# Patient Record
Sex: FEMALE | Race: WHITE | NOT HISPANIC OR LATINO
[De-identification: names, ages, dates, MRNs, and addresses within clinical notes are randomized per-mention and may not be internally consistent; named-entity substitution may affect disease eponyms.]

---

## 2017-02-06 ENCOUNTER — MEDICATION RENEWAL (OUTPATIENT)
Age: 47
End: 2017-02-06

## 2017-08-04 ENCOUNTER — APPOINTMENT (OUTPATIENT)
Dept: ORTHOPEDIC SURGERY | Facility: CLINIC | Age: 47
End: 2017-08-04

## 2017-08-10 ENCOUNTER — APPOINTMENT (OUTPATIENT)
Dept: ORTHOPEDIC SURGERY | Facility: CLINIC | Age: 47
End: 2017-08-10
Payer: MEDICARE

## 2017-08-10 VITALS
WEIGHT: 130 LBS | HEART RATE: 67 BPM | SYSTOLIC BLOOD PRESSURE: 91 MMHG | DIASTOLIC BLOOD PRESSURE: 55 MMHG | HEIGHT: 65 IN | BODY MASS INDEX: 21.66 KG/M2

## 2017-08-10 DIAGNOSIS — F41.1 GENERALIZED ANXIETY DISORDER: ICD-10-CM

## 2017-08-10 DIAGNOSIS — Z87.19 PERSONAL HISTORY OF OTHER DISEASES OF THE DIGESTIVE SYSTEM: ICD-10-CM

## 2017-08-10 PROCEDURE — 99204 OFFICE O/P NEW MOD 45 MIN: CPT

## 2017-08-10 PROCEDURE — 72040 X-RAY EXAM NECK SPINE 2-3 VW: CPT

## 2017-09-07 ENCOUNTER — APPOINTMENT (OUTPATIENT)
Dept: ORTHOPEDIC SURGERY | Facility: CLINIC | Age: 47
End: 2017-09-07

## 2017-10-09 ENCOUNTER — RX RENEWAL (OUTPATIENT)
Age: 47
End: 2017-10-09

## 2017-10-31 ENCOUNTER — MEDICATION RENEWAL (OUTPATIENT)
Age: 47
End: 2017-10-31

## 2017-11-01 ENCOUNTER — RX RENEWAL (OUTPATIENT)
Age: 47
End: 2017-11-01

## 2018-01-08 ENCOUNTER — RX RENEWAL (OUTPATIENT)
Age: 48
End: 2018-01-08

## 2018-01-09 ENCOUNTER — MEDICATION RENEWAL (OUTPATIENT)
Age: 48
End: 2018-01-09

## 2018-01-09 ENCOUNTER — APPOINTMENT (OUTPATIENT)
Dept: ENDOCRINOLOGY | Facility: CLINIC | Age: 48
End: 2018-01-09

## 2018-02-02 ENCOUNTER — APPOINTMENT (OUTPATIENT)
Dept: ENDOCRINOLOGY | Facility: CLINIC | Age: 48
End: 2018-02-02

## 2018-04-25 ENCOUNTER — RX RENEWAL (OUTPATIENT)
Age: 48
End: 2018-04-25

## 2018-08-05 ENCOUNTER — RX RENEWAL (OUTPATIENT)
Age: 48
End: 2018-08-05

## 2018-11-06 ENCOUNTER — APPOINTMENT (OUTPATIENT)
Dept: ENDOCRINOLOGY | Facility: CLINIC | Age: 48
End: 2018-11-06

## 2018-12-05 ENCOUNTER — APPOINTMENT (OUTPATIENT)
Dept: ENDOCRINOLOGY | Facility: CLINIC | Age: 48
End: 2018-12-05
Payer: MEDICARE

## 2018-12-05 VITALS
HEART RATE: 59 BPM | OXYGEN SATURATION: 98 % | HEIGHT: 65 IN | DIASTOLIC BLOOD PRESSURE: 62 MMHG | WEIGHT: 124.8 LBS | SYSTOLIC BLOOD PRESSURE: 100 MMHG | BODY MASS INDEX: 20.79 KG/M2

## 2018-12-05 PROCEDURE — 99214 OFFICE O/P EST MOD 30 MIN: CPT | Mod: GC

## 2018-12-18 ENCOUNTER — APPOINTMENT (OUTPATIENT)
Dept: OBGYN | Facility: CLINIC | Age: 48
End: 2018-12-18

## 2019-01-09 ENCOUNTER — APPOINTMENT (OUTPATIENT)
Dept: ORTHOPEDIC SURGERY | Facility: CLINIC | Age: 49
End: 2019-01-09

## 2019-03-22 ENCOUNTER — APPOINTMENT (OUTPATIENT)
Dept: PAIN MANAGEMENT | Facility: CLINIC | Age: 49
End: 2019-03-22

## 2019-03-29 ENCOUNTER — APPOINTMENT (OUTPATIENT)
Dept: OBGYN | Facility: CLINIC | Age: 49
End: 2019-03-29
Payer: MEDICARE

## 2019-03-29 VITALS
BODY MASS INDEX: 21.16 KG/M2 | WEIGHT: 127 LBS | HEART RATE: 66 BPM | HEIGHT: 65 IN | SYSTOLIC BLOOD PRESSURE: 113 MMHG | DIASTOLIC BLOOD PRESSURE: 64 MMHG

## 2019-03-29 DIAGNOSIS — Z86.59 PERSONAL HISTORY OF OTHER MENTAL AND BEHAVIORAL DISORDERS: ICD-10-CM

## 2019-03-29 DIAGNOSIS — Z86.39 PERSONAL HISTORY OF OTHER ENDOCRINE, NUTRITIONAL AND METABOLIC DISEASE: ICD-10-CM

## 2019-03-29 DIAGNOSIS — M50.30 OTHER CERVICAL DISC DEGENERATION, UNSPECIFIED CERVICAL REGION: ICD-10-CM

## 2019-03-29 DIAGNOSIS — M47.22 OTHER SPONDYLOSIS WITH RADICULOPATHY, CERVICAL REGION: ICD-10-CM

## 2019-03-29 DIAGNOSIS — Z12.31 ENCOUNTER FOR SCREENING MAMMOGRAM FOR MALIGNANT NEOPLASM OF BREAST: ICD-10-CM

## 2019-03-29 DIAGNOSIS — D25.0 SUBMUCOUS LEIOMYOMA OF UTERUS: ICD-10-CM

## 2019-03-29 DIAGNOSIS — Z87.39 PERSONAL HISTORY OF OTHER DISEASES OF THE MUSCULOSKELETAL SYSTEM AND CONNECTIVE TISSUE: ICD-10-CM

## 2019-03-29 PROCEDURE — 99203 OFFICE O/P NEW LOW 30 MIN: CPT

## 2019-03-31 PROBLEM — Z87.39 HISTORY OF NECK PAIN: Status: RESOLVED | Noted: 2017-08-10 | Resolved: 2019-03-31

## 2019-03-31 PROBLEM — M47.22 OSTEOARTHRITIS OF SPINE WITH RADICULOPATHY, CERVICAL REGION: Status: RESOLVED | Noted: 2017-08-10 | Resolved: 2019-03-31

## 2019-03-31 PROBLEM — M50.30 DEGENERATION, INTERVERTEBRAL DISC, CERVICAL: Status: RESOLVED | Noted: 2017-08-10 | Resolved: 2019-03-31

## 2019-03-31 NOTE — PHYSICAL EXAM
[Awake] : awake [Alert] : alert [Acute Distress] : no acute distress [Soft] : soft [Tender] : non tender [Distended] : not distended [Oriented x3] : oriented to person, place, and time [Depressed Mood] : not depressed

## 2019-03-31 NOTE — HISTORY OF PRESENT ILLNESS
[1 Year Ago] : 1 year ago [Good] : being in good health [Sexually Active] : is sexually active [Monogamous] : is monogamous [Male ___] : [unfilled] male [NA] : N/A

## 2019-05-06 ENCOUNTER — OUTPATIENT (OUTPATIENT)
Dept: OUTPATIENT SERVICES | Facility: HOSPITAL | Age: 49
LOS: 1 days | End: 2019-05-06
Payer: MEDICARE

## 2019-05-06 VITALS
HEART RATE: 67 BPM | TEMPERATURE: 98 F | DIASTOLIC BLOOD PRESSURE: 59 MMHG | RESPIRATION RATE: 14 BRPM | OXYGEN SATURATION: 96 % | HEIGHT: 65 IN | WEIGHT: 126.99 LBS | SYSTOLIC BLOOD PRESSURE: 100 MMHG

## 2019-05-06 DIAGNOSIS — D25.0 SUBMUCOUS LEIOMYOMA OF UTERUS: ICD-10-CM

## 2019-05-06 DIAGNOSIS — Z98.890 OTHER SPECIFIED POSTPROCEDURAL STATES: Chronic | ICD-10-CM

## 2019-05-06 DIAGNOSIS — Z98.41 CATARACT EXTRACTION STATUS, RIGHT EYE: Chronic | ICD-10-CM

## 2019-05-06 DIAGNOSIS — Q18.1 PREAURICULAR SINUS AND CYST: Chronic | ICD-10-CM

## 2019-05-06 DIAGNOSIS — Z01.818 ENCOUNTER FOR OTHER PREPROCEDURAL EXAMINATION: ICD-10-CM

## 2019-05-06 LAB
HCT VFR BLD CALC: 40.5 % — SIGNIFICANT CHANGE UP (ref 34.5–45)
HGB BLD-MCNC: 13.1 G/DL — SIGNIFICANT CHANGE UP (ref 11.5–15.5)
MCHC RBC-ENTMCNC: 29.8 PG — SIGNIFICANT CHANGE UP (ref 27–34)
MCHC RBC-ENTMCNC: 32.3 GM/DL — SIGNIFICANT CHANGE UP (ref 32–36)
MCV RBC AUTO: 92 FL — SIGNIFICANT CHANGE UP (ref 80–100)
PLATELET # BLD AUTO: 254 K/UL — SIGNIFICANT CHANGE UP (ref 150–400)
RBC # BLD: 4.4 M/UL — SIGNIFICANT CHANGE UP (ref 3.8–5.2)
RBC # FLD: 12.5 % — SIGNIFICANT CHANGE UP (ref 10.3–14.5)
WBC # BLD: 5.2 K/UL — SIGNIFICANT CHANGE UP (ref 3.8–10.5)
WBC # FLD AUTO: 5.2 K/UL — SIGNIFICANT CHANGE UP (ref 3.8–10.5)

## 2019-05-06 PROCEDURE — 85027 COMPLETE CBC AUTOMATED: CPT

## 2019-05-06 PROCEDURE — G0463: CPT

## 2019-05-06 RX ORDER — LANOLIN ALCOHOL/MO/W.PET/CERES
2 CREAM (GRAM) TOPICAL
Qty: 0 | Refills: 0 | COMMUNITY

## 2019-05-06 RX ORDER — ASCORBIC ACID 60 MG
1 TABLET,CHEWABLE ORAL
Qty: 0 | Refills: 0 | COMMUNITY

## 2019-05-06 RX ORDER — ALPRAZOLAM 0.25 MG
1 TABLET ORAL
Qty: 0 | Refills: 0 | COMMUNITY

## 2019-05-06 RX ORDER — UBIDECARENONE 100 MG
200 CAPSULE ORAL
Qty: 0 | Refills: 0 | COMMUNITY

## 2019-05-06 RX ORDER — CHOLECALCIFEROL (VITAMIN D3) 125 MCG
1 CAPSULE ORAL
Qty: 0 | Refills: 0 | COMMUNITY

## 2019-05-06 RX ORDER — LORATADINE 10 MG/1
1 TABLET ORAL
Qty: 0 | Refills: 0 | COMMUNITY

## 2019-05-06 RX ORDER — FLUOXETINE HCL 10 MG
1 CAPSULE ORAL
Qty: 0 | Refills: 0 | COMMUNITY

## 2019-05-06 NOTE — H&P PST ADULT - HISTORY OF PRESENT ILLNESS
49 yo presents for initial evaluation with outside medical records. With heavy bleeding and presence of two endometrial masses on sonohysterogram. I sat down with the patient to discuss the imaging findings & her symptoms which warrant intervention. I explained that the thickened endometrium can be caused by polyps or myoma. Discussion about management options for thickened endometrium and masses including continued observation and surgery. Hysteroscopy with removal of endometrial mass.       47 y.o. female with h/o hyperprolactinemia and pituitary adenoma here for follow up visit. Diagnosed with hyperprolactinemia during fertility work up in 2011. Reports has been having increased menstrual cycles, seeing ob/gyn for fibroids. No galactorrhea. Had visual field testing which was normal in 2015. No headaches. Does get anxiety and on Xanax. Taking Bromocriptine 2.5 mg daily. Reports ran out of medication 1 week ago, otherwise taking well. Last prolactin in Oct 2016 was 11.9. Last pituitary MRI from May 2016 shows 3.9 mm pituitary lesion. No optic chiasm or nerve compression. Patient without any plans for pregnancy. 47 yo female. c/o menorrhagia and imaging study revealed two endometrial masses on sonohysterogram. presents to PST scheduled for operative hysteroscopy with resectoscope and PAP smear under anesthesia.   PMH: h/o pituitary adenoma, last visit with endocrinologist, dr. Castorena 12/2018- note stated pt to repeat MRI in 3-5 years.      47 y.o. female with h/o hyperprolactinemia and pituitary adenoma here for follow up visit. Diagnosed with hyperprolactinemia during fertility work up in 2011. Reports has been having increased menstrual cycles, seeing ob/gyn for fibroids. No galactorrhea. Had visual field testing which was normal in 2015. No headaches. Does get anxiety and on Xanax. Taking Bromocriptine 2.5 mg daily. Reports ran out of medication 1 week ago, otherwise taking well. Last prolactin in Oct 2016 was 11.9. Last pituitary MRI from May 2016 shows 3.9 mm pituitary lesion. No optic chiasm or nerve compression. Patient without any plans for pregnancy. 49 yo female. c/o menorrhagia and imaging study revealed two endometrial masses on sonohysterogram. presents to PST scheduled for operative hysteroscopy with resectoscope and PAP smear under anesthesia.   PMH: h/o pituitary adenoma, last visit with endocrinologist, dr. Castorena 12/2018- note stated pt to repeat MRI in 3-5 years.    **at PST, pt c/o recurring dizziness, pt is not sure if it's related to anxiety, hypoglycemia, anemia or other etiology. denies LOC.  Last dizziness over 1 week ago.  Pt has not seen a PCP for over 1 year.  pt was instructed to obtain preop evaluation prior to planned procedure. Pt currently does not have a PCP, but stated she will find one and call [facility] to notify PCP's name and phone number.  Left phone message with Yuli,  at Dr. Yarbrough' office of above.***    47 y.o. female with h/o hyperprolactinemia and pituitary adenoma here for follow up visit. Diagnosed with hyperprolactinemia during fertility work up in 2011. Reports has been having increased menstrual cycles, seeing ob/gyn for fibroids. No galactorrhea. Had visual field testing which was normal in 2015. No headaches. Does get anxiety and on Xanax. Taking Bromocriptine 2.5 mg daily. Reports ran out of medication 1 week ago, otherwise taking well. Last prolactin in Oct 2016 was 11.9. Last pituitary MRI from May 2016 shows 3.9 mm pituitary lesion. No optic chiasm or nerve compression. Patient without any plans for pregnancy. 47 yo female. c/o menorrhagia and imaging study revealed two endometrial masses on sonohysterogram. presents to PST scheduled for operative hysteroscopy with resectoscope and PAP smear under anesthesia.   PMH: h/o pituitary adenoma,   **at PST, pt c/o recurring dizziness, pt is not sure if it's related to anxiety, hypoglycemia, anemia or other etiology. denies LOC.  Last dizziness over 1 week ago.  Pt has not seen a PCP for over 1 year.  pt was instructed to obtain preop evaluation prior to planned procedure. Pt currently does not have a PCP, but stated she will find one and call [facility] to notify PCP's name and phone number.  Left phone message with Yuli,  at Dr. Yarbrough' office of above.***    47 y.o. female with h/o hyperprolactinemia and pituitary adenoma here for follow up visit. Diagnosed with hyperprolactinemia during fertility work up in 2011. Reports has been having increased menstrual cycles, seeing ob/gyn for fibroids. No galactorrhea. Had visual field testing which was normal in 2015. No headaches. Does get anxiety and on Xanax. Taking Bromocriptine 2.5 mg daily. Reports ran out of medication 1 week ago, otherwise taking well. Last prolactin in Oct 2016 was 11.9. Last pituitary MRI from May 2016 shows 3.9 mm pituitary lesion. No optic chiasm or nerve compression. Patient without any plans for pregnancy. 47 yo female. PMH pituitary adenoma (last endo visit 12/2018- note on chart), MVP.  c/o menorrhagia, sonohysterogram revealed two endometrial masses. presents to PST scheduled for operative hysteroscopy with resectoscope and PAP smear under anesthesia.   **at PST, pt c/o recurring dizziness, pt is not sure if it's related to anxiety, hypoglycemia, anemia or other etiology. denies LOC.  Last dizziness over 1 week ago.  Pt has not seen a PCP for over 1 year.  pt was instructed to obtain preop evaluation prior to planned procedure. Pt currently does not have a PCP, but stated she will find one and call PST to notify PCP's name and phone number.  Left phone message with Yuli,  at Dr. Yarbrough' office of above.***

## 2019-05-06 NOTE — H&P PST ADULT - NSICDXPASTMEDICALHX_GEN_ALL_CORE_FT
PAST MEDICAL HISTORY:  Anxiety     IBS (irritable bowel syndrome)     Insomnia     MVP (mitral valve prolapse)     Pituitary adenoma

## 2019-05-06 NOTE — H&P PST ADULT - NSICDXPASTSURGICALHX_GEN_ALL_CORE_FT
PAST SURGICAL HISTORY:  Cyst on ear s/p excision left    H/O cataract extraction, right     H/O rhinoplasty

## 2019-05-06 NOTE — H&P PST ADULT - NSICDXPROBLEM_GEN_ALL_CORE_FT
PROBLEM DIAGNOSES  Problem: Submucous leiomyoma of uterus  Assessment and Plan: D&C  operative hysteroscopy w/ resectoscope  Pap Smear under anesthesia

## 2019-05-07 PROBLEM — D35.2 BENIGN NEOPLASM OF PITUITARY GLAND: Chronic | Status: ACTIVE | Noted: 2019-05-06

## 2019-05-09 ENCOUNTER — APPOINTMENT (OUTPATIENT)
Dept: OBGYN | Facility: HOSPITAL | Age: 49
End: 2019-05-09

## 2019-05-09 PROBLEM — I34.1 NONRHEUMATIC MITRAL (VALVE) PROLAPSE: Chronic | Status: ACTIVE | Noted: 2019-05-06

## 2019-05-09 PROBLEM — F41.9 ANXIETY DISORDER, UNSPECIFIED: Chronic | Status: ACTIVE | Noted: 2019-05-06

## 2019-05-09 PROBLEM — K58.9 IRRITABLE BOWEL SYNDROME WITHOUT DIARRHEA: Chronic | Status: ACTIVE | Noted: 2019-05-06

## 2019-05-09 PROBLEM — G47.00 INSOMNIA, UNSPECIFIED: Chronic | Status: ACTIVE | Noted: 2019-05-06

## 2019-05-13 LAB
ACTH SER-ACNC: 9.2 PG/ML
CORTIS SERPL-MCNC: 7.3 UG/DL
ESTRADIOL SERPL-MCNC: 85 PG/ML
FSH SERPL-MCNC: 3.7 IU/L
IGF BP1 SERPL-MCNC: 127 NG/ML
LH SERPL-ACNC: 2.9 IU/L
PROLACTIN SERPL-MCNC: 53.4 NG/ML
T4 FREE SERPL-MCNC: 1 NG/DL
TSH SERPL-ACNC: 1.1 UIU/ML

## 2019-05-21 ENCOUNTER — APPOINTMENT (OUTPATIENT)
Dept: OBGYN | Facility: HOSPITAL | Age: 49
End: 2019-05-21

## 2019-05-28 ENCOUNTER — APPOINTMENT (OUTPATIENT)
Dept: OBGYN | Facility: CLINIC | Age: 49
End: 2019-05-28

## 2019-06-04 ENCOUNTER — APPOINTMENT (OUTPATIENT)
Dept: OBGYN | Facility: CLINIC | Age: 49
End: 2019-06-04

## 2019-06-05 ENCOUNTER — APPOINTMENT (OUTPATIENT)
Dept: OBGYN | Facility: CLINIC | Age: 49
End: 2019-06-05

## 2019-10-09 ENCOUNTER — APPOINTMENT (OUTPATIENT)
Dept: ENDOCRINOLOGY | Facility: CLINIC | Age: 49
End: 2019-10-09

## 2019-10-30 ENCOUNTER — APPOINTMENT (OUTPATIENT)
Dept: ENDOCRINOLOGY | Facility: CLINIC | Age: 49
End: 2019-10-30

## 2019-11-07 ENCOUNTER — APPOINTMENT (OUTPATIENT)
Dept: OBGYN | Facility: CLINIC | Age: 49
End: 2019-11-07

## 2019-12-06 ENCOUNTER — APPOINTMENT (OUTPATIENT)
Dept: OBGYN | Facility: CLINIC | Age: 49
End: 2019-12-06

## 2020-01-13 ENCOUNTER — APPOINTMENT (OUTPATIENT)
Dept: ENDOCRINOLOGY | Facility: CLINIC | Age: 50
End: 2020-01-13

## 2020-02-24 ENCOUNTER — APPOINTMENT (OUTPATIENT)
Dept: ENDOCRINOLOGY | Facility: CLINIC | Age: 50
End: 2020-02-24

## 2020-03-27 ENCOUNTER — APPOINTMENT (OUTPATIENT)
Dept: OBGYN | Facility: CLINIC | Age: 50
End: 2020-03-27

## 2020-04-21 ENCOUNTER — APPOINTMENT (OUTPATIENT)
Dept: RHEUMATOLOGY | Facility: CLINIC | Age: 50
End: 2020-04-21

## 2020-05-05 ENCOUNTER — APPOINTMENT (OUTPATIENT)
Dept: OBGYN | Facility: CLINIC | Age: 50
End: 2020-05-05

## 2020-05-18 ENCOUNTER — APPOINTMENT (OUTPATIENT)
Dept: OBGYN | Facility: CLINIC | Age: 50
End: 2020-05-18
Payer: MEDICARE

## 2020-05-18 ENCOUNTER — ASOB RESULT (OUTPATIENT)
Age: 50
End: 2020-05-18

## 2020-05-18 VITALS
WEIGHT: 130 LBS | TEMPERATURE: 97.9 F | HEIGHT: 65 IN | SYSTOLIC BLOOD PRESSURE: 104 MMHG | HEART RATE: 55 BPM | DIASTOLIC BLOOD PRESSURE: 66 MMHG | BODY MASS INDEX: 21.66 KG/M2

## 2020-05-18 DIAGNOSIS — Z01.411 ENCOUNTER FOR GYNECOLOGICAL EXAMINATION (GENERAL) (ROUTINE) WITH ABNORMAL FINDINGS: ICD-10-CM

## 2020-05-18 PROCEDURE — 76831 ECHO EXAM UTERUS: CPT

## 2020-05-18 PROCEDURE — 99396 PREV VISIT EST AGE 40-64: CPT

## 2020-05-19 LAB
CANDIDA VAG CYTO: NOT DETECTED
G VAGINALIS+PREV SP MTYP VAG QL MICRO: NOT DETECTED
HBV SURFACE AG SER QL: NONREACTIVE
HCV AB SER QL: NONREACTIVE
HCV S/CO RATIO: 0.15 S/CO
HIV1+2 AB SPEC QL IA.RAPID: NONREACTIVE
HPV HIGH+LOW RISK DNA PNL CVX: NOT DETECTED
T PALLIDUM AB SER QL IA: NEGATIVE
T VAGINALIS VAG QL WET PREP: NOT DETECTED

## 2020-05-19 NOTE — HISTORY OF PRESENT ILLNESS
[1 Year Ago] : 1 year ago [Fair] : being in fair health [Regular Exercise] : regular exercise [Weight Concerns] : no concerns with her weight [Unsure Pap Smear] : uncertain timing of her last Papanicolaou cytology [Reproductive Age] : is of reproductive age [Menstrual Problems] : reports abnormal menses [Definite ___ (Date)] : the last menstrual period was [unfilled] [Irregular Cycle Intervals] : are  irregular [Frequency: Q ___ days] : occur approximately every [unfilled] days [Interval Has Decreased] : have been more frequent [Contraception] : does not use contraception [Sexually Active] : is sexually active [Monogamous] : is monogamous

## 2020-05-19 NOTE — REVIEW OF SYSTEMS
[Fever] : no fever [Abdominal Pain] : no abdominal pain [Dysuria] : no dysuria [Breast Pain] : no breast pain [Abn Vag Bleeding] : abnormal vaginal bleeding [Pelvic Pain] : no pelvic pain [Nl] : Musculoskeletal

## 2020-05-19 NOTE — PHYSICAL EXAM
[Awake] : awake [Alert] : alert [Acute Distress] : no acute distress [Mass] : no breast mass [Nipple Discharge] : no nipple discharge [Axillary LAD] : no axillary lymphadenopathy [Soft] : soft [Tender] : non tender [Oriented x3] : oriented to person, place, and time [No Bleeding] : there was no active vaginal bleeding [Normal] : uterus [Pap Obtained] : a Pap smear was performed [Uterine Adnexae] : were not tender and not enlarged

## 2020-05-20 LAB
C TRACH RRNA SPEC QL NAA+PROBE: NOT DETECTED
CYTOLOGY CVX/VAG DOC THIN PREP: NORMAL
N GONORRHOEA RRNA SPEC QL NAA+PROBE: NOT DETECTED
SOURCE AMPLIFICATION: NORMAL

## 2020-08-04 ENCOUNTER — APPOINTMENT (OUTPATIENT)
Dept: RHEUMATOLOGY | Facility: CLINIC | Age: 50
End: 2020-08-04
Payer: MEDICARE

## 2020-08-04 VITALS
DIASTOLIC BLOOD PRESSURE: 60 MMHG | WEIGHT: 130 LBS | TEMPERATURE: 97.3 F | SYSTOLIC BLOOD PRESSURE: 95 MMHG | OXYGEN SATURATION: 98 % | HEART RATE: 70 BPM | BODY MASS INDEX: 21.66 KG/M2 | HEIGHT: 65 IN

## 2020-08-04 DIAGNOSIS — Z87.891 PERSONAL HISTORY OF NICOTINE DEPENDENCE: ICD-10-CM

## 2020-08-04 DIAGNOSIS — Z82.61 FAMILY HISTORY OF ARTHRITIS: ICD-10-CM

## 2020-08-04 PROCEDURE — 99203 OFFICE O/P NEW LOW 30 MIN: CPT

## 2020-08-04 NOTE — REVIEW OF SYSTEMS
[Chills] : no chills [Fever] : no fever [As Noted in HPI] : as noted in HPI [Negative] : Gastrointestinal

## 2020-08-04 NOTE — ASSESSMENT
[FreeTextEntry1] : =Chronic generalized arthralgias for many years, appear non inflammatory in pattern\par No evidence of inflammatory arthritis on exam today \par Several tender points, reports associated low energy, fatigue and poor sleep\par Longstanding history of anxiety on SSRI\par Suspect FM rather than inflammatory arthritis\par recommend to obtain serologies today for completion\par Xray of the hands\par follow up in 2 weeks to discuss results\par \par \par \par Patient aware of my assessment and plan, all questions answered.\par

## 2020-08-04 NOTE — PHYSICAL EXAM
[General Appearance - In No Acute Distress] : in no acute distress [General Appearance - Alert] : alert [Sclera] : the sclera and conjunctiva were normal [Hearing Threshold Finger Rub Not [place]] : hearing was normal [Respiration, Rhythm And Depth] : normal respiratory rhythm and effort [Edema] : there was no peripheral edema [Abdomen Soft] : soft [Abdomen Tenderness] : non-tender [Oriented To Time, Place, And Person] : oriented to person, place, and time [] : no rash [FreeTextEntry1] : no joint swelling, tenderness at several PIPs and DIP, left elbow and bilateral shoulders, scattered tender points

## 2020-08-04 NOTE — HISTORY OF PRESENT ILLNESS
[FreeTextEntry1] : 50 yo W, presenting for evaluation of joint pains. \par \par =Reports having pain in her joints for few years \par involving the shoulders, elbows, knees...\par comes in bouts and lasts for few months at at time\par associated with morning stiffness for few minutes \par no associated swelling or warmth \par takes Advil with complete relief\par reports associated low energy, fatigue, feels exhausted in the morning\par frequent headaches\par =suffers from anxiety for many years\par on SSRI \par \par Rheumatologic ROS:\par - No alopecia\par - + dry eyes, no dry mouth\par - No history of red/painful eye\par - No oral ulcers\par - No Raynaud's \par - No rashes or photosensitivity\par - No miscarriages or pregnancy complications\par - No history of blood clots\par - No family history of auto-immune disease  \par \par

## 2020-08-12 LAB
ALBUMIN SERPL ELPH-MCNC: 4.3 G/DL
ALP BLD-CCNC: 41 U/L
ALT SERPL-CCNC: 10 U/L
ANA SER IF-ACNC: NEGATIVE
ANION GAP SERPL CALC-SCNC: 13 MMOL/L
AST SERPL-CCNC: 15 U/L
B BURGDOR AB SER-IMP: NEGATIVE
B BURGDOR IGM PATRN SER IB-IMP: NEGATIVE
B BURGDOR18KD IGG SER QL IB: NORMAL
B BURGDOR23KD IGG SER QL IB: NORMAL
B BURGDOR23KD IGM SER QL IB: NORMAL
B BURGDOR28KD IGG SER QL IB: NORMAL
B BURGDOR30KD IGG SER QL IB: NORMAL
B BURGDOR31KD IGG SER QL IB: PRESENT
B BURGDOR39KD IGG SER QL IB: NORMAL
B BURGDOR39KD IGM SER QL IB: NORMAL
B BURGDOR41KD IGG SER QL IB: NORMAL
B BURGDOR41KD IGM SER QL IB: NORMAL
B BURGDOR45KD IGG SER QL IB: NORMAL
B BURGDOR58KD IGG SER QL IB: NORMAL
B BURGDOR66KD IGG SER QL IB: NORMAL
B BURGDOR93KD IGG SER QL IB: NORMAL
BASOPHILS # BLD AUTO: 0.05 K/UL
BASOPHILS NFR BLD AUTO: 0.9 %
BILIRUB SERPL-MCNC: 0.3 MG/DL
BUN SERPL-MCNC: 9 MG/DL
C3 SERPL-MCNC: 76 MG/DL
C4 SERPL-MCNC: 16 MG/DL
CALCIUM SERPL-MCNC: 9.1 MG/DL
CCP AB SER IA-ACNC: <8 UNITS
CHLORIDE SERPL-SCNC: 104 MMOL/L
CO2 SERPL-SCNC: 21 MMOL/L
CREAT SERPL-MCNC: 0.85 MG/DL
CRP SERPL-MCNC: 0.13 MG/DL
DSDNA AB SER-ACNC: <12 IU/ML
ENA RNP AB SER IA-ACNC: <0.2 AL
ENA SM AB SER IA-ACNC: <0.2 AL
ENA SS-A AB SER IA-ACNC: <0.2 AL
ENA SS-B AB SER IA-ACNC: <0.2 AL
EOSINOPHIL # BLD AUTO: 0.17 K/UL
EOSINOPHIL NFR BLD AUTO: 3 %
ERYTHROCYTE [SEDIMENTATION RATE] IN BLOOD BY WESTERGREN METHOD: 17 MM/HR
GLUCOSE SERPL-MCNC: 88 MG/DL
HBV CORE IGG+IGM SER QL: NONREACTIVE
HBV SURFACE AG SER QL: NONREACTIVE
HCT VFR BLD CALC: 40.5 %
HCV AB SER QL: NONREACTIVE
HCV S/CO RATIO: 0.08 S/CO
HGB BLD-MCNC: 12.8 G/DL
IMM GRANULOCYTES NFR BLD AUTO: 0.2 %
LYMPHOCYTES # BLD AUTO: 1.8 K/UL
LYMPHOCYTES NFR BLD AUTO: 31.8 %
MAN DIFF?: NORMAL
MCHC RBC-ENTMCNC: 28.8 PG
MCHC RBC-ENTMCNC: 31.6 GM/DL
MCV RBC AUTO: 91.2 FL
MONOCYTES # BLD AUTO: 0.42 K/UL
MONOCYTES NFR BLD AUTO: 7.4 %
NEUTROPHILS # BLD AUTO: 3.21 K/UL
NEUTROPHILS NFR BLD AUTO: 56.7 %
PLATELET # BLD AUTO: 254 K/UL
POTASSIUM SERPL-SCNC: 4.8 MMOL/L
PROT SERPL-MCNC: 6.7 G/DL
RBC # BLD: 4.44 M/UL
RBC # FLD: 13.2 %
RF+CCP IGG SER-IMP: NEGATIVE
RHEUMATOID FACT SER QL: 12 IU/ML
SODIUM SERPL-SCNC: 137 MMOL/L
WBC # FLD AUTO: 5.66 K/UL

## 2020-08-20 ENCOUNTER — APPOINTMENT (OUTPATIENT)
Dept: RHEUMATOLOGY | Facility: CLINIC | Age: 50
End: 2020-08-20
Payer: MEDICARE

## 2020-08-20 DIAGNOSIS — M25.50 PAIN IN UNSPECIFIED JOINT: ICD-10-CM

## 2020-08-20 DIAGNOSIS — M79.7 FIBROMYALGIA: ICD-10-CM

## 2020-08-20 PROCEDURE — 99213 OFFICE O/P EST LOW 20 MIN: CPT | Mod: 95

## 2020-08-20 NOTE — HISTORY OF PRESENT ILLNESS
[Other Location: e.g. Home (Enter Location, City,State)___] : at [unfilled] [Home] : at home, [unfilled] , at the time of the visit. [Verbal consent obtained from patient] : the patient, [unfilled] [FreeTextEntry1] : 48 yo W, presenting for evaluation of joint pains. \par \par =Reports having pain in her joints for few years \par involving the shoulders, elbows, knees...\par comes in bouts and lasts for few months at at time\par associated with morning stiffness for few minutes \par no associated swelling or warmth \par takes Advil with complete relief\par reports associated low energy, fatigue, feels exhausted in the morning\par frequent headaches\par =suffers from anxiety for many years\par on SSRI \par \par Rheumatologic ROS:\par - No alopecia\par - + dry eyes, no dry mouth\par - No history of red/painful eye\par - No oral ulcers\par - No Raynaud's \par - No rashes or photosensitivity\par - No miscarriages or pregnancy complications\par - No history of blood clots\par - No family history of auto-immune disease \par

## 2020-08-20 NOTE — ASSESSMENT
[FreeTextEntry1] : =Chronic generalized arthralgias for many years, appear non inflammatory in pattern\par No evidence of inflammatory arthritis on exam last visit \par Several tender points, reports associated low energy, fatigue and poor sleep\par Longstanding history of anxiety on SSRI\par Suspect FM rather than inflammatory arthritis\par Extensive serologic work up done at last visit was unrevealing \par \par Discussed the significance of the syndrome, manifestations and treatment approach such as first line physical therapy and aerobic exercises.\par discussed sleep hygiene\par \par Fup in 3-4 months or earlier as needed \par \par \par Patient aware of my assessment and plan, all questions answered.\par \par \par

## 2020-08-20 NOTE — DATA REVIEWED
[FreeTextEntry1] : Labs done at last visit reviewed with patient\par \par testing for lupus and other rheumatologic processes was unrevealing \par

## 2020-08-20 NOTE — PHYSICAL EXAM
[General Appearance - In No Acute Distress] : in no acute distress [General Appearance - Alert] : alert [No Focal Deficits] : no focal deficits [Oriented To Time, Place, And Person] : oriented to person, place, and time

## 2020-12-01 ENCOUNTER — APPOINTMENT (OUTPATIENT)
Dept: ENDOCRINOLOGY | Facility: CLINIC | Age: 50
End: 2020-12-01

## 2021-02-02 ENCOUNTER — APPOINTMENT (OUTPATIENT)
Dept: ENDOCRINOLOGY | Facility: CLINIC | Age: 51
End: 2021-02-02

## 2021-04-07 ENCOUNTER — APPOINTMENT (OUTPATIENT)
Dept: ENDOCRINOLOGY | Facility: CLINIC | Age: 51
End: 2021-04-07
Payer: MEDICARE

## 2021-04-07 VITALS
HEART RATE: 87 BPM | OXYGEN SATURATION: 97 % | WEIGHT: 134 LBS | SYSTOLIC BLOOD PRESSURE: 118 MMHG | TEMPERATURE: 97.4 F | BODY MASS INDEX: 22.3 KG/M2 | DIASTOLIC BLOOD PRESSURE: 72 MMHG

## 2021-04-07 PROCEDURE — 99072 ADDL SUPL MATRL&STAF TM PHE: CPT

## 2021-04-07 PROCEDURE — 99214 OFFICE O/P EST MOD 30 MIN: CPT

## 2021-04-07 RX ORDER — IBUPROFEN 800 MG/1
800 TABLET, FILM COATED ORAL 3 TIMES DAILY
Qty: 90 | Refills: 0 | Status: DISCONTINUED | COMMUNITY
Start: 2017-08-10 | End: 2021-04-07

## 2021-04-07 NOTE — PHYSICAL EXAM
[Alert] : alert [No Acute Distress] : no acute distress [Normal Sclera/Conjunctiva] : normal sclera/conjunctiva [EOMI] : extra ocular movement intact [No LAD] : no lymphadenopathy [Thyroid Not Enlarged] : the thyroid was not enlarged [No Thyroid Nodules] : no palpable thyroid nodules [No Respiratory Distress] : no respiratory distress [Clear to Auscultation] : lungs were clear to auscultation bilaterally [Normal S1, S2] : normal S1 and S2 [Regular Rhythm] : with a regular rhythm [No Edema] : no peripheral edema [Normal Bowel Sounds] : normal bowel sounds [Not Tender] : non-tender [Not Distended] : not distended [Soft] : abdomen soft [Normal Anterior Cervical Nodes] : no anterior cervical lymphadenopathy [No Clubbing, Cyanosis] : no clubbing  or cyanosis of the fingernails [No Rash] : no rash [Normal Reflexes] : deep tendon reflexes were 2+ and symmetric [Normal Affect] : the affect was normal [Normal Mood] : the mood was normal

## 2021-04-07 NOTE — ASSESSMENT
[FreeTextEntry1] : 50 y.o. female with h/o hyperprolactinemia, pituitary adenoma and vitamin D def.\par \par 1. Hyperprolactinemia- Clinically stable with normal menstrual cycle and no galactorrhea. Serum prolactin level is mildly elevated. Will monitor for now off bromocriptine.\par \par 2. Pituitary microadenoma- Will check pituitary MRI to evaluate adenoma. Recommend follow up with ophthalmology\par \par 3. Vitamin D def- Recommend vitamin D3 2,000 IU daily\par \par Follow up in 6 months\par

## 2021-04-07 NOTE — HISTORY OF PRESENT ILLNESS
[FreeTextEntry1] : 50 y.o. female with h/o hyperprolactinemia and pituitary adenoma here for follow up visit. Diagnosed with hyperprolactinemia during fertility work up in 2011. Reports regular menstrual cycle though sometimes are more frequent. Saw GYN and being monitored for fibroids. No galactorrhea. Had visual field testing which was normal in 2015. Due for ophthalmology visit.  Does get headaches but not atypical. Does get anxiety and on Xanax and Prozac. She was taking Bromocriptine 2.5 mg daily until 12/2018. Last pituitary MRI from  May 2016 shows 3.9 mm pituitary lesion. No optic chiasm or nerve compression. Patient without any plans for pregnancy.

## 2021-04-07 NOTE — REVIEW OF SYSTEMS
[Fatigue] : fatigue [Recent Weight Gain (___ Lbs)] : recent weight gain: [unfilled] lbs [Gas/Bloating] : gas/bloating [Myalgia] : myalgia  [Headaches] : headaches [Anxiety] : anxiety [Cold Intolerance] : cold intolerance [Heat Intolerance] : heat intolerance [Negative] : Respiratory [Polyuria] : no polyuria [Irregular Menses] : regular menses [Dry Skin] : no dry skin [Hair Loss] : no hair loss [Polydipsia] : no polydipsia [Swelling] : no swelling

## 2021-04-07 NOTE — DATA REVIEWED
[FreeTextEntry1] : Labs\par 3/29/21\par H/H 13.6/41.4\par glucose 85\par BUN/cr 11/0.82\par calcium 9.4\par chol 255 HDL 61  tri 173\par prolactin 58.1\par 25 vitamin D 22.2\par TSH 1.56 Free T4 1.09\par

## 2021-04-16 DIAGNOSIS — N39.0 URINARY TRACT INFECTION, SITE NOT SPECIFIED: ICD-10-CM

## 2021-05-18 ENCOUNTER — APPOINTMENT (OUTPATIENT)
Dept: OBGYN | Facility: CLINIC | Age: 51
End: 2021-05-18
Payer: MEDICARE

## 2021-05-18 VITALS
SYSTOLIC BLOOD PRESSURE: 100 MMHG | BODY MASS INDEX: 22.49 KG/M2 | HEART RATE: 63 BPM | TEMPERATURE: 97.7 F | HEIGHT: 65 IN | DIASTOLIC BLOOD PRESSURE: 56 MMHG | WEIGHT: 135 LBS

## 2021-05-18 DIAGNOSIS — Z11.3 ENCOUNTER FOR SCREENING FOR INFECTIONS WITH A PREDOMINANTLY SEXUAL MODE OF TRANSMISSION: ICD-10-CM

## 2021-05-18 DIAGNOSIS — N93.9 ABNORMAL UTERINE AND VAGINAL BLEEDING, UNSPECIFIED: ICD-10-CM

## 2021-05-18 DIAGNOSIS — D21.9 BENIGN NEOPLASM OF CONNECTIVE AND OTHER SOFT TISSUE, UNSPECIFIED: ICD-10-CM

## 2021-05-18 DIAGNOSIS — Z20.822 CONTACT WITH AND (SUSPECTED) EXPOSURE TO COVID-19: ICD-10-CM

## 2021-05-18 PROCEDURE — 99396 PREV VISIT EST AGE 40-64: CPT

## 2021-05-18 PROCEDURE — 99072 ADDL SUPL MATRL&STAF TM PHE: CPT

## 2021-05-19 LAB
C TRACH RRNA SPEC QL NAA+PROBE: NOT DETECTED
COVID-19 SPIKE DOMAIN ANTIBODY INTERPRETATION: POSITIVE
ESTRADIOL SERPL-MCNC: 62 PG/ML
HBV SURFACE AG SER QL: NONREACTIVE
HCG SERPL-MCNC: <1 MIU/ML
HCV AB SER QL: NONREACTIVE
HCV S/CO RATIO: 0.1 S/CO
HIV1+2 AB SPEC QL IA.RAPID: NONREACTIVE
LH SERPL-ACNC: 6.6 IU/L
N GONORRHOEA RRNA SPEC QL NAA+PROBE: NOT DETECTED
SARS-COV-2 AB SERPL IA-ACNC: 19.5 U/ML
SOURCE AMPLIFICATION: NORMAL
T PALLIDUM AB SER QL IA: NEGATIVE
T3FREE SERPL-MCNC: 2.13 PG/ML
T4 FREE SERPL-MCNC: 1.1 NG/DL
TSH SERPL-ACNC: 1.77 UIU/ML

## 2021-05-20 LAB — HPV HIGH+LOW RISK DNA PNL CVX: NOT DETECTED

## 2021-05-22 LAB
CYTOLOGY CVX/VAG DOC THIN PREP: NORMAL
FSH SERPL-MCNC: 15.3 IU/L
PROLACTIN SERPL-MCNC: 65 NG/ML

## 2021-05-23 LAB
TESTOST BND SERPL-MCNC: 1.8 PG/ML
TESTOST SERPL-MCNC: 22.5 NG/DL

## 2021-05-25 LAB — DHEA-SULFATE, SERUM: 138 UG/DL

## 2021-06-04 ENCOUNTER — NON-APPOINTMENT (OUTPATIENT)
Age: 51
End: 2021-06-04

## 2021-06-07 ENCOUNTER — NON-APPOINTMENT (OUTPATIENT)
Age: 51
End: 2021-06-07

## 2021-09-22 ENCOUNTER — APPOINTMENT (OUTPATIENT)
Dept: RHEUMATOLOGY | Facility: CLINIC | Age: 51
End: 2021-09-22

## 2021-10-04 ENCOUNTER — APPOINTMENT (OUTPATIENT)
Dept: ENDOCRINOLOGY | Facility: CLINIC | Age: 51
End: 2021-10-04

## 2021-11-15 NOTE — H&P PST ADULT - NSANTHSNORERD_ENT_A_CORE
Dr. Senthil Fairbanks, [address]  [address] [place] [address]  [phone]   No

## 2021-11-22 NOTE — H&P PST ADULT - MALLAMPATI CLASS
[FreeTextEntry1] :  low fat diet. continue weight loss. \par better glycemic control.\par B 12 supplement, low purine diet. \par continue current management. Flu vaccine deferred by patient. \par \par   Class II - visualization of the soft palate, fauces, and uvula

## 2021-12-11 ENCOUNTER — APPOINTMENT (OUTPATIENT)
Dept: MRI IMAGING | Facility: CLINIC | Age: 51
End: 2021-12-11

## 2022-02-09 ENCOUNTER — NON-APPOINTMENT (OUTPATIENT)
Age: 52
End: 2022-02-09

## 2022-02-23 ENCOUNTER — APPOINTMENT (OUTPATIENT)
Dept: SPINE | Facility: CLINIC | Age: 52
End: 2022-02-23
Payer: MEDICARE

## 2022-02-23 VITALS
WEIGHT: 135 LBS | HEART RATE: 68 BPM | DIASTOLIC BLOOD PRESSURE: 74 MMHG | SYSTOLIC BLOOD PRESSURE: 111 MMHG | BODY MASS INDEX: 22.49 KG/M2 | HEIGHT: 65 IN | OXYGEN SATURATION: 94 %

## 2022-02-23 PROCEDURE — 99204 OFFICE O/P NEW MOD 45 MIN: CPT

## 2022-02-25 ENCOUNTER — APPOINTMENT (OUTPATIENT)
Dept: OPHTHALMOLOGY | Facility: CLINIC | Age: 52
End: 2022-02-25
Payer: MEDICARE

## 2022-02-25 ENCOUNTER — NON-APPOINTMENT (OUTPATIENT)
Age: 52
End: 2022-02-25

## 2022-02-25 PROCEDURE — 92133 CPTRZD OPH DX IMG PST SGM ON: CPT

## 2022-02-25 PROCEDURE — 99204 OFFICE O/P NEW MOD 45 MIN: CPT

## 2022-02-25 PROCEDURE — 92083 EXTENDED VISUAL FIELD XM: CPT

## 2022-03-04 ENCOUNTER — APPOINTMENT (OUTPATIENT)
Dept: ENDOCRINOLOGY | Facility: CLINIC | Age: 52
End: 2022-03-04
Payer: MEDICARE

## 2022-03-04 VITALS
HEART RATE: 74 BPM | WEIGHT: 138 LBS | SYSTOLIC BLOOD PRESSURE: 110 MMHG | DIASTOLIC BLOOD PRESSURE: 70 MMHG | TEMPERATURE: 97.5 F | HEIGHT: 65 IN | OXYGEN SATURATION: 99 % | BODY MASS INDEX: 22.99 KG/M2

## 2022-03-04 DIAGNOSIS — R53.83 OTHER FATIGUE: ICD-10-CM

## 2022-03-04 DIAGNOSIS — E55.9 VITAMIN D DEFICIENCY, UNSPECIFIED: ICD-10-CM

## 2022-03-04 DIAGNOSIS — E22.1 HYPERPROLACTINEMIA: ICD-10-CM

## 2022-03-04 DIAGNOSIS — D35.2 BENIGN NEOPLASM OF PITUITARY GLAND: ICD-10-CM

## 2022-03-04 PROCEDURE — 99214 OFFICE O/P EST MOD 30 MIN: CPT

## 2022-03-05 RX ORDER — ALPRAZOLAM 0.5 MG/1
0.5 TABLET ORAL
Qty: 30 | Refills: 0 | Status: ACTIVE | COMMUNITY
Start: 2021-12-13

## 2022-03-05 RX ORDER — AMOXICILLIN 500 MG/1
500 CAPSULE ORAL
Qty: 8 | Refills: 0 | Status: DISCONTINUED | COMMUNITY
Start: 2022-01-26

## 2022-03-05 RX ORDER — NITROFURANTOIN (MONOHYDRATE/MACROCRYSTALS) 25; 75 MG/1; MG/1
100 CAPSULE ORAL TWICE DAILY
Qty: 14 | Refills: 0 | Status: DISCONTINUED | COMMUNITY
Start: 2021-04-16 | End: 2022-03-05

## 2022-03-05 NOTE — ASSESSMENT
[FreeTextEntry1] : 51 y.o. female with h/o hyperprolactinemia, pituitary adenoma and vitamin D def.\par \par 1. Hyperprolactinemia- Clinically stable with normal menstrual cycle and no galactorrhea. Will check serum prolactin level with macroprolactin, HCG, IGF-1 and GH. Will monitor for now off bromocriptine given stable prolactin levels which given their mild degree of elevation are unlikely from a prolactinoma but rather pituitary stalk effect.\par \par 2. Pituitary adenoma with right optic nerve impingement- Most recent eye exam shows normal visual field testing. Neurosurgery recommends observation and will repeat pituitary MRI in 1 year. Recommend follow up with ophthalmology yearly. Will check pituitary hormone axis. \par \par 3. Vitamin D def- Will check 25 vitamin D level and supplement accordingly\par \par 4. Fatigue- Will check CBC, iron studies and vitamin B12 with folate\par \par Follow up in 1 year if stable\par

## 2022-03-05 NOTE — HISTORY OF PRESENT ILLNESS
[FreeTextEntry1] : 51 y.o. female with h/o hyperprolactinemia and pituitary adenoma here for follow up visit. Diagnosed with hyperprolactinemia during fertility work up in 2011. Reports regular menstrual cycle though sometimes are more frequent. Saw GYN and being monitored for fibroids. No galactorrhea. Had visual field testing which was normal in 2015 and in February 2022.  Does get headaches but not atypical. Does get anxiety and on Xanax and Prozac. She was taking Bromocriptine 2.5 mg daily until 12/2018. Pituitary MRI from  May 2016 shows 3.9 mm pituitary lesion. No optic chiasm or nerve compression. Patient without any plans for pregnancy.\par \par Most recent pituitary MRI on 12/13/21 shows enlarged pituitary gland measuring 10 mm un height probably containing an adenoma, extends into the suprasellar cistern and impinges upon the right optic nerve. \par \par She did follow up with neuroophthalmology and neurosurgery who recommends monitoring for now and repeating MRI in 1 year.

## 2022-03-05 NOTE — REVIEW OF SYSTEMS
[Fatigue] : fatigue [Recent Weight Gain (___ Lbs)] : recent weight gain: [unfilled] lbs [Constipation] : constipation [Myalgia] : myalgia  [Headaches] : headaches [Anxiety] : anxiety [Negative] : Respiratory [Polyuria] : no polyuria [Irregular Menses] : regular menses [Dry Skin] : no dry skin [Hair Loss] : no hair loss [Polydipsia] : no polydipsia [Cold Intolerance] : no cold intolerance [Heat Intolerance] : no heat intolerance [Hot Flashes] : no hot flashes [Swelling] : no swelling

## 2022-04-26 LAB
25(OH)D3 SERPL-MCNC: 26.3 NG/ML
ABO + RH PNL BLD: NORMAL
ACTH SER-ACNC: 29 PG/ML
ALBUMIN SERPL ELPH-MCNC: 4.4 G/DL
ALP BLD-CCNC: 57 U/L
ALT SERPL-CCNC: 16 U/L
ANION GAP SERPL CALC-SCNC: 14 MMOL/L
AST SERPL-CCNC: 14 U/L
BASOPHILS # BLD AUTO: 0.05 K/UL
BASOPHILS NFR BLD AUTO: 0.5 %
BILIRUB SERPL-MCNC: 0.2 MG/DL
BUN SERPL-MCNC: 14 MG/DL
CALCIUM SERPL-MCNC: 9.6 MG/DL
CHLORIDE SERPL-SCNC: 102 MMOL/L
CHOLEST SERPL-MCNC: 267 MG/DL
CO2 SERPL-SCNC: 23 MMOL/L
CORTIS SERPL-MCNC: 20.7 UG/DL
CREAT SERPL-MCNC: 0.78 MG/DL
EGFR: 92 ML/MIN/1.73M2
EOSINOPHIL # BLD AUTO: 0.06 K/UL
EOSINOPHIL NFR BLD AUTO: 0.6 %
ESTIMATED AVERAGE GLUCOSE: 111 MG/DL
ESTRADIOL SERPL-MCNC: 122 PG/ML
FERRITIN SERPL-MCNC: 12 NG/ML
FOLATE SERPL-MCNC: 6.5 NG/ML
FSH SERPL-MCNC: 6.1 IU/L
GH SERPL-MCNC: 0.15 NG/ML
GLUCOSE SERPL-MCNC: 85 MG/DL
HBA1C MFR BLD HPLC: 5.5 %
HCG SERPL-MCNC: <1 MIU/ML
HCT VFR BLD CALC: 42.6 %
HDLC SERPL-MCNC: 62 MG/DL
HGB BLD-MCNC: 13.5 G/DL
IGF-1 INTERP: NORMAL
IGF-I BLD-MCNC: 181 NG/ML
IMM GRANULOCYTES NFR BLD AUTO: 0.2 %
IRON SATN MFR SERPL: 25 %
IRON SERPL-MCNC: 97 UG/DL
LDLC SERPL CALC-MCNC: 181 MG/DL
LH SERPL-ACNC: 4.7 IU/L
LYMPHOCYTES # BLD AUTO: 1.97 K/UL
LYMPHOCYTES NFR BLD AUTO: 18.8 %
MAN DIFF?: NORMAL
MCHC RBC-ENTMCNC: 28.5 PG
MCHC RBC-ENTMCNC: 31.7 GM/DL
MCV RBC AUTO: 90.1 FL
MONOCYTES # BLD AUTO: 0.74 K/UL
MONOCYTES NFR BLD AUTO: 7.1 %
MONOMERIC PROLACTIN (ICMA)*: 49.3 NG/ML
NEUTROPHILS # BLD AUTO: 7.63 K/UL
NEUTROPHILS NFR BLD AUTO: 72.8 %
NONHDLC SERPL-MCNC: 206 MG/DL
PERCENT MACROPROLACTIN: 25 %
PLATELET # BLD AUTO: 292 K/UL
POTASSIUM SERPL-SCNC: 4.7 MMOL/L
PROLACTIN SERPL-MCNC: 66.8 NG/ML
PROLACTIN, SERUM (ICMA)*: 66 NG/ML
PROT SERPL-MCNC: 6.9 G/DL
RBC # BLD: 4.73 M/UL
RBC # FLD: 14.1 %
SODIUM SERPL-SCNC: 139 MMOL/L
T4 FREE SERPL-MCNC: 0.9 NG/DL
TIBC SERPL-MCNC: 390 UG/DL
TRIGL SERPL-MCNC: 124 MG/DL
TSH SERPL-ACNC: 2.13 UIU/ML
UIBC SERPL-MCNC: 293 UG/DL
VIT B12 SERPL-MCNC: 422 PG/ML
WBC # FLD AUTO: 10.47 K/UL

## 2022-06-22 ENCOUNTER — APPOINTMENT (OUTPATIENT)
Dept: GASTROENTEROLOGY | Facility: CLINIC | Age: 52
End: 2022-06-22

## 2022-09-09 ENCOUNTER — APPOINTMENT (OUTPATIENT)
Dept: OPHTHALMOLOGY | Facility: CLINIC | Age: 52
End: 2022-09-09

## 2023-02-22 ENCOUNTER — APPOINTMENT (OUTPATIENT)
Dept: SPINE | Facility: CLINIC | Age: 53
End: 2023-02-22

## 2023-05-23 ENCOUNTER — APPOINTMENT (OUTPATIENT)
Dept: OBGYN | Facility: CLINIC | Age: 53
End: 2023-05-23